# Patient Record
Sex: MALE | Race: WHITE | NOT HISPANIC OR LATINO | Employment: OTHER | URBAN - METROPOLITAN AREA
[De-identification: names, ages, dates, MRNs, and addresses within clinical notes are randomized per-mention and may not be internally consistent; named-entity substitution may affect disease eponyms.]

---

## 2022-04-19 ENCOUNTER — HOSPITAL ENCOUNTER (OUTPATIENT)
Dept: PREADMISSION TESTING | Facility: OTHER | Age: 37
Discharge: HOME | End: 2022-04-19
Attending: ORTHOPAEDIC SURGERY
Payer: OTHER GOVERNMENT

## 2022-04-19 ENCOUNTER — ANESTHESIA EVENT (OUTPATIENT)
Dept: SURGERY | Facility: OTHER | Age: 37
End: 2022-04-19
Payer: OTHER GOVERNMENT

## 2022-04-19 VITALS
SYSTOLIC BLOOD PRESSURE: 128 MMHG | OXYGEN SATURATION: 100 % | RESPIRATION RATE: 16 BRPM | TEMPERATURE: 98 F | DIASTOLIC BLOOD PRESSURE: 71 MMHG | BODY MASS INDEX: 28.23 KG/M2 | HEIGHT: 74 IN | WEIGHT: 220 LBS | HEART RATE: 68 BPM

## 2022-04-19 RX ORDER — LIDOCAINE HYDROCHLORIDE 10 MG/ML
0.5 INJECTION, SOLUTION EPIDURAL; INFILTRATION; INTRACAUDAL; PERINEURAL ONCE
Status: CANCELLED | OUTPATIENT
Start: 2022-04-19 | End: 2022-04-19

## 2022-04-19 RX ORDER — ACETAMINOPHEN 500 MG
1000 TABLET ORAL
Status: CANCELLED | OUTPATIENT
Start: 2022-04-19 | End: 2022-04-19

## 2022-04-19 RX ORDER — SODIUM CHLORIDE, SODIUM LACTATE, POTASSIUM CHLORIDE, CALCIUM CHLORIDE 600; 310; 30; 20 MG/100ML; MG/100ML; MG/100ML; MG/100ML
INJECTION, SOLUTION INTRAVENOUS CONTINUOUS
Status: CANCELLED | OUTPATIENT
Start: 2022-04-19

## 2022-04-19 NOTE — DISCHARGE INSTRUCTIONS
Information to Prepare you for your Surgery    PRE-ADMIT TESTING -  [phone]    [address] [facility]          Your surgery has been scheduled at [facility]. We are pleased to have the opportunity to serve you. For Further Information please call [phone].    On the day of surgery please report to the Information Desk on the 1st floor.    CONTACT YOUR PHYSICIAN'S OFFICE THE DAY PRIOR TO YOUR SURGERY TO OBTAIN YOUR ARRIVAL TIME.     The evening before surgery do not eat anything after 9 p.m. ( this includes hard candy, chewing gum and mints).  You may only have GATORADE, POWERADE AND WATER  from 9 p.m. until you leave your home.   DO NOT DRINK ANY LIQUIDS ON THE WAY TO THE HOSPITAL.      Why does your anesthesiologist allow you to drink Gatorade/Powerade before surgery?  Gatorade/Powerade helps to increase your comfort before surgery and to decrease your nausea after surgery. The carbohydrates in Gatorade/Powerade help reduce your body's stress response to surgery.  If you are a diabetic-drink only water prior to surgery.      Current Visitor policy(12/27/2021) - Patients may have 2 visitors pre and post procedure. Only 2 visitors will be allowed in the Surgical building with the patient.     SPECIAL MEDICATION INSTRUCTIONS: TAKE medications checked off by the Anesthesiologist on your Medication List.    Angiogram Patients: Take medications as instructed by your physician, including aspirin.     Surgery Patients:    If you take ASPIRIN - Your PHYSICIAN/SURGEON will need to inform you IF/OR when you need to stop taking aspirin prior to your surgery.     Do Not take any medications containing IBUPROFEN.    Do Not Wear any make-up (especially eye make-up) to surgery. Please remove any false eyelashes or eyelash extensions. If you arrive the day of surgery with makeup/eyelashes on you will be required to remove prior to surgery. (There is a risk of corneal  abrasions if eye makeup/eyelash extensions are not removed)      Leave all valuables at home.   Do Not wear any jewelry or watches, including any metal in body piercings. Jewelry must be removed prior to coming to the hospital.  There is a possibility that rings that are unable to be removed may be cut off if they are on the surgical extremity.    Please remove all hair extensions, wigs, clips and any other metal accessories/ ornaments from your hair.  These items may pose a flammable/fire risk in Surgery and must be removed.    Do not shave your surgical area at least 5 days prior to your surgery. The surgical prep will be performed at the hospital according to Infection Control regulations.    Contact Lens must be removed before surgery. Either do not wear the contact lens or bring a case and solution for storage.  Please bring a container for eyeglasses or dentures as required.  Bring any paperwork your physician has provided, such as consent forms,  history and physicals, doctor's orders, etc.   Bring comfortable clothes that are loose fitting to wear upon discharge. Take into consideration the type of surgery being performed.  Maintain your diet as advised per your physician the day prior to surgery.      Adequate rest the night before surgery is advised.   Park in the Parking lot behind the hospital or in the [employer] [facility] across the street from the parking lot. Parking is complimentary.  If you will be discharged the same day as your procedure, please arrange for a responsible adult to drive you home or to accompany you if traveling by taxi.   YOU WILL NOT BE PERMITTED TO DRIVE OR TO LEAVE THE HOSPITAL ALONE AFTER SURGERY.   If you are being discharged the same day, it is strongly recommended that you arrange for someone to remain with you for the first 24 hrs following your surgery.    The Surgeon will speak to your family/visitor after your surgery regarding the outcome of your surgery and post op  care.  The Surgeon may speak to you after your surgery, but there is a possibility you may not remember the details.  Please check with your family members regarding the conversation with the Surgeon.    We strongly recommend whoever is bringing you home be present for discharge instructions.  This will ensure a thorough understanding for your post op home care.    ALL CHILDREN MUST ALWAYS BE ACCOMPANIED BY AN ADULT.    Visitors-Refer to current Visitor policy handouts.    Thank you for your cooperation.  The Staff of [facility].            Bathing Instructions with Hibiclens    Shower the evening before and morning of your procedure with Hibiclens:  Wash your face with water and your regular face wash/soap  Apply Hibiclens directly on your skin or on a wet washcloth and wash gently. When showering: Move away from the shower stream when applying Hibiclens to avoid rinsing off too soon.  Rinse thoroughly with warm water  Do not dilute Hibiclens        Dry off as usual, do not use any deodorant, powder, body lotions, perfume, after shave or cologne.

## 2022-04-19 NOTE — ANESTHESIA PREPROCEDURE EVALUATION
04/19/2022  Omer Chow is a 37 y.o., male.      Pre-op Assessment    I have reviewed the Patient Summary Reports.     I have reviewed the Nursing Notes.    I have reviewed the Medications.     Review of Systems  Anesthesia Hx:  No previous Anesthesia  Denies Family Hx of Anesthesia complications.   Denies Personal Hx of Anesthesia complications.   Social:  Non-Smoker    Hematology/Oncology:  Hematology Normal   Oncology Normal     EENT/Dental:EENT/Dental Normal   Cardiovascular:  Cardiovascular Normal     Pulmonary:  Pulmonary Normal    Renal/:  Renal/ Normal     Hepatic/GI:  Hepatic/GI Normal    Musculoskeletal:  Musculoskeletal Normal    Neurological:  Neurology Normal    Endocrine:  Endocrine Normal    Dermatological:  Skin Normal    Psych:  Psychiatric Normal           Physical Exam  General: Well nourished, Cooperative, Alert and Oriented    Airway:  Mallampati: I   Mouth Opening: Normal  Neck ROM: Normal ROM    Dental:  Intact        Anesthesia Plan  Type of Anesthesia, risks & benefits discussed:    Anesthesia Type: Gen ETT  Intra-op Monitoring Plan: Standard ASA Monitors  Post Op Pain Control Plan: multimodal analgesia and peripheral nerve block  Induction:  IV  Airway Plan: Video, Post-Induction  ASA Score: 1  Anesthesia Plan Notes: No lab,block discussed    Ready For Surgery From Anesthesia Perspective.     .

## 2022-04-22 ENCOUNTER — HOSPITAL ENCOUNTER (OUTPATIENT)
Facility: OTHER | Age: 37
Discharge: HOME | End: 2022-04-22
Attending: ORTHOPAEDIC SURGERY | Admitting: ORTHOPAEDIC SURGERY
Payer: OTHER GOVERNMENT

## 2022-04-22 ENCOUNTER — ANESTHESIA (OUTPATIENT)
Dept: SURGERY | Facility: OTHER | Age: 37
End: 2022-04-22
Payer: OTHER GOVERNMENT

## 2022-04-22 VITALS
TEMPERATURE: 98 F | RESPIRATION RATE: 16 BRPM | DIASTOLIC BLOOD PRESSURE: 78 MMHG | OXYGEN SATURATION: 98 % | HEART RATE: 80 BPM | WEIGHT: 220 LBS | HEIGHT: 74 IN | BODY MASS INDEX: 28.23 KG/M2 | SYSTOLIC BLOOD PRESSURE: 125 MMHG

## 2022-04-22 DIAGNOSIS — S43.431A SLAP LESION OF RIGHT SHOULDER: ICD-10-CM

## 2022-04-22 PROCEDURE — C1713 ANCHOR/SCREW BN/BN,TIS/BN: HCPCS | Performed by: ORTHOPAEDIC SURGERY

## 2022-04-22 PROCEDURE — 25000003 PHARM REV CODE 250: Performed by: ORTHOPAEDIC SURGERY

## 2022-04-22 PROCEDURE — 01716 ANES NRV MSC UPR A&E TNDSIS: CPT | Performed by: ORTHOPAEDIC SURGERY

## 2022-04-22 PROCEDURE — 25000003 PHARM REV CODE 250: Performed by: ANESTHESIOLOGY

## 2022-04-22 PROCEDURE — 71000033 HC RECOVERY, INTIAL HOUR: Performed by: ORTHOPAEDIC SURGERY

## 2022-04-22 PROCEDURE — C1776 JOINT DEVICE (IMPLANTABLE): HCPCS | Performed by: ORTHOPAEDIC SURGERY

## 2022-04-22 PROCEDURE — 63600175 PHARM REV CODE 636 W HCPCS: Performed by: ANESTHESIOLOGY

## 2022-04-22 PROCEDURE — 36000710: Performed by: ORTHOPAEDIC SURGERY

## 2022-04-22 PROCEDURE — 27201423 OPTIME MED/SURG SUP & DEVICES STERILE SUPPLY: Performed by: ORTHOPAEDIC SURGERY

## 2022-04-22 PROCEDURE — 71000016 HC POSTOP RECOV ADDL HR: Performed by: ORTHOPAEDIC SURGERY

## 2022-04-22 PROCEDURE — 63600175 PHARM REV CODE 636 W HCPCS: Performed by: ORTHOPAEDIC SURGERY

## 2022-04-22 PROCEDURE — 37000008 HC ANESTHESIA 1ST 15 MINUTES: Performed by: ORTHOPAEDIC SURGERY

## 2022-04-22 PROCEDURE — 25000003 PHARM REV CODE 250: Performed by: NURSE ANESTHETIST, CERTIFIED REGISTERED

## 2022-04-22 PROCEDURE — 37000009 HC ANESTHESIA EA ADD 15 MINS: Performed by: ORTHOPAEDIC SURGERY

## 2022-04-22 PROCEDURE — 63600175 PHARM REV CODE 636 W HCPCS: Performed by: NURSE ANESTHETIST, CERTIFIED REGISTERED

## 2022-04-22 PROCEDURE — 71000015 HC POSTOP RECOV 1ST HR: Performed by: ORTHOPAEDIC SURGERY

## 2022-04-22 PROCEDURE — 63600175 PHARM REV CODE 636 W HCPCS

## 2022-04-22 PROCEDURE — 36000711: Performed by: ORTHOPAEDIC SURGERY

## 2022-04-22 PROCEDURE — 63600175 PHARM REV CODE 636 W HCPCS: Performed by: PHYSICIAN ASSISTANT

## 2022-04-22 PROCEDURE — 64415 NJX AA&/STRD BRCH PLXS IMG: CPT | Performed by: ANESTHESIOLOGY

## 2022-04-22 RX ORDER — MEPERIDINE HYDROCHLORIDE 25 MG/ML
12.5 INJECTION INTRAMUSCULAR; INTRAVENOUS; SUBCUTANEOUS ONCE AS NEEDED
Status: DISCONTINUED | OUTPATIENT
Start: 2022-04-22 | End: 2022-04-22 | Stop reason: HOSPADM

## 2022-04-22 RX ORDER — HYDROCODONE BITARTRATE AND ACETAMINOPHEN 7.5; 325 MG/1; MG/1
1 TABLET ORAL
Qty: 40 TABLET | Refills: 0 | Status: SHIPPED | OUTPATIENT
Start: 2022-04-22

## 2022-04-22 RX ORDER — METHYLPREDNISOLONE ACETATE 40 MG/ML
INJECTION, SUSPENSION INTRA-ARTICULAR; INTRALESIONAL; INTRAMUSCULAR; SOFT TISSUE
Status: DISCONTINUED | OUTPATIENT
Start: 2022-04-22 | End: 2022-04-22 | Stop reason: HOSPADM

## 2022-04-22 RX ORDER — CEFAZOLIN SODIUM 1 G/3ML
2 INJECTION, POWDER, FOR SOLUTION INTRAMUSCULAR; INTRAVENOUS
Status: COMPLETED | OUTPATIENT
Start: 2022-04-22 | End: 2022-04-22

## 2022-04-22 RX ORDER — SODIUM CHLORIDE 0.9 % (FLUSH) 0.9 %
3 SYRINGE (ML) INJECTION
Status: DISCONTINUED | OUTPATIENT
Start: 2022-04-22 | End: 2022-04-22 | Stop reason: HOSPADM

## 2022-04-22 RX ORDER — MIDAZOLAM HYDROCHLORIDE 1 MG/ML
INJECTION INTRAMUSCULAR; INTRAVENOUS
Status: DISCONTINUED | OUTPATIENT
Start: 2022-04-22 | End: 2022-04-22

## 2022-04-22 RX ORDER — DEXAMETHASONE SODIUM PHOSPHATE 4 MG/ML
INJECTION, SOLUTION INTRA-ARTICULAR; INTRALESIONAL; INTRAMUSCULAR; INTRAVENOUS; SOFT TISSUE
Status: DISCONTINUED | OUTPATIENT
Start: 2022-04-22 | End: 2022-04-22

## 2022-04-22 RX ORDER — PROPOFOL 10 MG/ML
VIAL (ML) INTRAVENOUS
Status: DISCONTINUED | OUTPATIENT
Start: 2022-04-22 | End: 2022-04-22

## 2022-04-22 RX ORDER — OXYCODONE HYDROCHLORIDE 5 MG/1
5 TABLET ORAL
Status: DISCONTINUED | OUTPATIENT
Start: 2022-04-22 | End: 2022-04-22 | Stop reason: HOSPADM

## 2022-04-22 RX ORDER — LIDOCAINE HYDROCHLORIDE 10 MG/ML
0.5 INJECTION, SOLUTION EPIDURAL; INFILTRATION; INTRACAUDAL; PERINEURAL ONCE
Status: DISCONTINUED | OUTPATIENT
Start: 2022-04-22 | End: 2022-04-22 | Stop reason: HOSPADM

## 2022-04-22 RX ORDER — ACETAMINOPHEN 500 MG
1000 TABLET ORAL
Status: COMPLETED | OUTPATIENT
Start: 2022-04-22 | End: 2022-04-22

## 2022-04-22 RX ORDER — SODIUM CHLORIDE, SODIUM LACTATE, POTASSIUM CHLORIDE, CALCIUM CHLORIDE 600; 310; 30; 20 MG/100ML; MG/100ML; MG/100ML; MG/100ML
INJECTION, SOLUTION INTRAVENOUS CONTINUOUS
Status: DISCONTINUED | OUTPATIENT
Start: 2022-04-22 | End: 2022-04-22 | Stop reason: HOSPADM

## 2022-04-22 RX ORDER — FENTANYL CITRATE 50 UG/ML
INJECTION, SOLUTION INTRAMUSCULAR; INTRAVENOUS
Status: DISCONTINUED | OUTPATIENT
Start: 2022-04-22 | End: 2022-04-22

## 2022-04-22 RX ORDER — PROCHLORPERAZINE EDISYLATE 5 MG/ML
5 INJECTION INTRAMUSCULAR; INTRAVENOUS EVERY 30 MIN PRN
Status: DISCONTINUED | OUTPATIENT
Start: 2022-04-22 | End: 2022-04-22 | Stop reason: HOSPADM

## 2022-04-22 RX ORDER — ONDANSETRON 2 MG/ML
INJECTION INTRAMUSCULAR; INTRAVENOUS
Status: DISCONTINUED | OUTPATIENT
Start: 2022-04-22 | End: 2022-04-22

## 2022-04-22 RX ORDER — BUPIVACAINE HYDROCHLORIDE 2.5 MG/ML
INJECTION, SOLUTION EPIDURAL; INFILTRATION; INTRACAUDAL
Status: DISCONTINUED | OUTPATIENT
Start: 2022-04-22 | End: 2022-04-22 | Stop reason: HOSPADM

## 2022-04-22 RX ORDER — HYDROMORPHONE HYDROCHLORIDE 2 MG/ML
0.4 INJECTION, SOLUTION INTRAMUSCULAR; INTRAVENOUS; SUBCUTANEOUS EVERY 5 MIN PRN
Status: DISCONTINUED | OUTPATIENT
Start: 2022-04-22 | End: 2022-04-22 | Stop reason: HOSPADM

## 2022-04-22 RX ORDER — ROPIVACAINE HYDROCHLORIDE 5 MG/ML
INJECTION, SOLUTION EPIDURAL; INFILTRATION; PERINEURAL
Status: COMPLETED | OUTPATIENT
Start: 2022-04-22 | End: 2022-04-22

## 2022-04-22 RX ORDER — ROCURONIUM BROMIDE 10 MG/ML
INJECTION, SOLUTION INTRAVENOUS
Status: DISCONTINUED | OUTPATIENT
Start: 2022-04-22 | End: 2022-04-22

## 2022-04-22 RX ORDER — LIDOCAINE HYDROCHLORIDE 20 MG/ML
INJECTION INTRAVENOUS
Status: DISCONTINUED | OUTPATIENT
Start: 2022-04-22 | End: 2022-04-22

## 2022-04-22 RX ORDER — SODIUM CHLORIDE 9 MG/ML
INJECTION, SOLUTION INTRAVENOUS CONTINUOUS
Status: DISCONTINUED | OUTPATIENT
Start: 2022-04-22 | End: 2022-04-22 | Stop reason: HOSPADM

## 2022-04-22 RX ADMIN — ONDANSETRON HYDROCHLORIDE 4 MG: 2 INJECTION INTRAMUSCULAR; INTRAVENOUS at 07:04

## 2022-04-22 RX ADMIN — PROPOFOL 250 MG: 10 INJECTION, EMULSION INTRAVENOUS at 06:04

## 2022-04-22 RX ADMIN — ROCURONIUM BROMIDE 50 MG: 10 INJECTION, SOLUTION INTRAVENOUS at 06:04

## 2022-04-22 RX ADMIN — ACETAMINOPHEN 1000 MG: 500 TABLET, FILM COATED ORAL at 05:04

## 2022-04-22 RX ADMIN — SUGAMMADEX 200 MG: 100 INJECTION, SOLUTION INTRAVENOUS at 07:04

## 2022-04-22 RX ADMIN — MIDAZOLAM HYDROCHLORIDE 2 MG: 1 INJECTION, SOLUTION INTRAMUSCULAR; INTRAVENOUS at 06:04

## 2022-04-22 RX ADMIN — LIDOCAINE HYDROCHLORIDE 50 MG: 20 INJECTION, SOLUTION INTRAVENOUS at 06:04

## 2022-04-22 RX ADMIN — DEXAMETHASONE SODIUM PHOSPHATE 8 MG: 4 INJECTION, SOLUTION INTRAMUSCULAR; INTRAVENOUS at 07:04

## 2022-04-22 RX ADMIN — ROPIVACAINE HYDROCHLORIDE 20 ML: 5 INJECTION, SOLUTION EPIDURAL; INFILTRATION; PERINEURAL at 06:04

## 2022-04-22 RX ADMIN — GLYCOPYRROLATE 0.2 MG: 0.2 INJECTION, SOLUTION INTRAMUSCULAR; INTRAVITREAL at 07:04

## 2022-04-22 RX ADMIN — SODIUM CHLORIDE, SODIUM LACTATE, POTASSIUM CHLORIDE, AND CALCIUM CHLORIDE: 600; 310; 30; 20 INJECTION, SOLUTION INTRAVENOUS at 06:04

## 2022-04-22 RX ADMIN — FENTANYL CITRATE 100 MCG: 50 INJECTION, SOLUTION INTRAMUSCULAR; INTRAVENOUS at 06:04

## 2022-04-22 RX ADMIN — CEFAZOLIN 2 G: 330 INJECTION, POWDER, FOR SOLUTION INTRAMUSCULAR; INTRAVENOUS at 07:04

## 2022-04-22 NOTE — ANESTHESIA POSTPROCEDURE EVALUATION
Anesthesia Post Evaluation    Patient: Omer Chow    Procedure(s) Performed: Procedure(s) (LRB):  ARTHROSCOPY, SHOULDER (Right)  REPAIR,TENDON,DISTAL PROXIMAL (Right)    Final Anesthesia Type: general      Patient location during evaluation: PACU  Patient participation: Yes- Able to Participate  Level of consciousness: awake and alert  Post-procedure vital signs: reviewed and stable  Pain management: adequate  Airway patency: patent    PONV status at discharge: No PONV  Anesthetic complications: no      Cardiovascular status: blood pressure returned to baseline  Respiratory status: unassisted  Hydration status: euvolemic  Follow-up not needed.          Vitals Value Taken Time   /78 04/22/22 0950   Temp 36.8 °C (98.2 °F) 04/22/22 0850   Pulse 80 04/22/22 0950   Resp 16 04/22/22 0950   SpO2 98 % 04/22/22 0950         Event Time   Out of Recovery 08:47:04         Pain/Mo Score: Pain Rating Prior to Med Admin: 0 (4/22/2022  5:35 AM)  Mo Score: 10 (4/22/2022  9:50 AM)

## 2022-04-22 NOTE — H&P
"[facility]  History & Physical  Orthopedic Surgery    Subjective:     Chief Complaint/Reason for Admission: Right shoulder pain.    History of Present Illness:  Omer Chow is a 37 y.o. y/o male presenting with a history of pain in the right shoulder. Risks and benefits of surgery were discussed and the patient wishes to proceed with surgery at this time.      There are no problems to display for this patient.      No current facility-administered medications for this encounter.  No current outpatient medications on file.    Review of patient's allergies indicates:  No Known Allergies    Past Medical History:   Diagnosis Date    Sleep apnea     does not wear cpaq        Past Surgical History:   Procedure Laterality Date    KNEE ARTHROSCOPY          No family history on file.     Social History     Tobacco Use    Smoking status: Never Smoker    Smokeless tobacco: Never Used   Substance Use Topics    Alcohol use: Yes     Comment: rare        Review of Systems:  Constitutional: negative for fever, weight loss  Cardiovascular: negative for chest pain, edema  Respiratory: negative for shortness of breath, cough  HEENT: negative for headaches, vision problems  Skin: negative for bruising, skin infections, rashes  GI: negative for nausea, vomiting  : negative for dysuria, hematuria  Psych: negative for anxiety, depression  Musculoskeletal: positive for joint pain  Endocrine: negative for cold intolerance, excessive thirst  Hematologic: negative for prolonged bleeding, use of blood thinners    OBJECTIVE:     General Appearance:    Alert, cooperative, no distress, appears stated age   Vital Signs:            Head:      Vitals:    04/21/22 0900   Weight: 99.8 kg (220 lb)   Height: 6' 2" (1.88 m)        Normocephalic, without obvious abnormality, atraumatic   Eyes:    PERRL, conjunctiva/corneas clear, both eyes        Nose:   Nares normal, no drainage or sinus tenderness   Throat:   " Lips, mucosa, and tongue normal; teeth and gums normal   Neck:   Supple, normal ROM   Back:     Symmetric, no curvature, ROM normal   Lungs:     CTA bilaterally, respirations unlabored   Chest wall:    No tenderness or deformity   Heart:    Regular rate and rhythm, S1 and S2 normal, no murmur, rub   or gallop   Abdomen:     Soft, non-tender   Extremities:   See clinic note   Pulses:   2+ and symmetric all extremities   Skin:   Skin color, texture, turgor normal, no rashes or lesions           Imaging Review:  Imaging reviewed    ASSESSMENT/PLAN:     Plan/Surgical Procedure: Right shoulder arthroscopy    Surgery Date / Location: 4/22/22 by Dr. Mcdonald at [facility]    Pre-op clearance per Anesthesia    DVT Prophylaxis: No blood thinners will be required post-operatively    Pt will d/c NSAIDs, Aspirin-containing products 7 days prior to procedure.    Informed written consent has been signed, patient wishes to proceed at this time.      Fred Torres PA-C  Orthopedics

## 2022-04-22 NOTE — PLAN OF CARE
Omer Niurkajustine Geraldo has met all discharge criteria from Phase II. Vital Signs are stable, ambulating  without difficulty. Discharge instructions given, patient verbalized understanding. Discharged from facility via wheelchair in stable condition.

## 2022-04-22 NOTE — PATIENT INSTRUCTIONS
Daniel Mcdonald M.D.  Sarabjit Bingham M.D.  Monster Hilton M.D.          Critical access hospital[facility] [address]  [place] [address]  Phone: [phone]  Fax: [phone]         DISCHARGE INSTRUCTIONS for Shoulder Surgery             Call our office ([phone]) immediately if you experience any of the following:      Excessive bleeding or pus like drainage at the incision site       Uncontrollable pain not relieved by pain medication       Excessive swelling or redness at the incision site       Fever above 101.5 degrees not controlled with Tylenol or Motrin       Shortness of Breath       Any foul odor or blistering from the surgery site     FOR EMERGENCIES: If any unusual problems or difficulties occur, call our office at [phone], or [phone] (After hours) or contact 911 at any time for emergencies    1.   Diet: Eat a liquid / bland diet for the first day after surgery. Progress your to your regular diet as tolerated. Constipation may occur with Narcotic usage, contact our office if you are experiencing constipation.    2.   Pain Management: Ice, pain medications and anesthesia injections are used to manage your post-operative pain.    *Medications: You were given one or more narcotic pain medications before leaving the hospital. Have the prescriptions filled at a pharmacy on your way home and follow the instructions on the bottles.    *Narcotic Medication (usually Norco - hydrocodone or Percocet - oxycodone): Take this medication if needed to relieve severe pain. Take 1 pill every 4 hours. If your pain is not relieved you make take a second pill at your next dose. Always take with food.     *Take note: if you find you are taking more than 1 pill at EACH dose, contact the office as        the amount of acetaminophen may exceed appropriate levels.    *Nausea / Vomiting: For this issue, contact our office for a separate prescription that may be called in to your pharmacy.    *Cold Therapy: You may have been  sent home with a cold therapy unit and wrap for your shoulder. Fill with ice and water to the indicated fill line and use throughout the day for the first 3-5 days and then as needed to help relieve pain and control swelling. If you have not received one of these units, a bag of Ice will work as well. Use it as often as 20 minutes ONCE every hour. You can continue this for several weeks following surgery if needed.    *Regional Anesthesia Injections (Blocks): You may have been given a regional nerve block either before or after surgery. This may make your entire arm numb for 24-36 hours.          * Proceed with caution when trying to use your arm     3.  Constipation: During your hospital stay, you will be given a bowel regulating medication known as Miralax (Polyethylene Glycol 3350 or PEG 3350), this is given once daily and should be taken daily as long as you are taking pain medicine. It is an odorless, colorless powder and dissolved without any aftertaste. This helps regulate bowel function and is important to counteract the constipation effect of the pain medicine you will be given after surgery. If you continue to experience constipation after you are discharged, follow this recommendation:  Miralax - 1 cap full mixed with any liquid once daily  If no bowel movement OR very painful/difficult bowel movement within 2-3 days, begin Miralax - 1 cap full mixed with any liquid twice daily, then once a normal bowel movement occurs, decrease back to once daily  If no bowel movement with the twice daily regimen, take Miralax every 8 hours until a bowel movement occurs, then decrease back to once daily    4. Blood Clot Prevention: Blood clots are potential complications following any surgery. A blood clot from your leg can travel to your lungs and cause serious health complications. Preventing a blood clot from forming is critical and we do this by doing taking the following actions:  Exercising and staying active  (moving about)  Wearing support stockings  The symptoms of a blood clot include:   Pain and / or redness in your calf and leg unrelated to your incision.   Increased swelling of your thigh, calf, ankle, or foot.   Increased skin temperature at the site of the incision.   Shortness of breath and chest pain or pain when breathing.    5.   Return visit: Please schedule your return visit to Dr. Mcdonald's office approximately 3 days after your procedure.    6.   Activity: Limit your activity during the first 48 hours, keep your arm elevated with pillows. After the first 48 hours at home, increase your activity level based on your symptoms.    7.   Dressing Change: Arthroscopy portals (wounds) are small and are usually closed with either steri-strips or sutures. It is normal for some blood / drainage to be seen on the dressings. It is also normal for you to see apparent bruising on the skin around your shoulder when you remove the dressing. If present, leave the steri-strip tape across the incisions. If you are concerned by the drainage or the appearance of your shoulder, please call one of the numbers listed above. You can remove the dressing (not the steri-strips) on the 2nd day after surgery. For larger incisions, you will need to keep it away from water until the stitches or staples are removed. You can use an ace bandage for support and compression if desired.     8.   Showering: You may shower on the 3rd day after surgery if the wound is dry and clean, but do not let the wound soak in water until sutures are removed. Do not submerge in any water until after your 2 week postoperative appointment in clinic.    9.   Shoulder Sling (with/without Pillow attachment): You may have been sent home with a sling / pillow attachment holding your arm away from your body. You may remove the sling when changing clothes or bathing. Make sure to wear the sling while sleeping unless instructed otherwise. You may remove at rest or for  exercises.       [x] You need to wear the sling for 24 hours a day for  4 weeks.    10.  Shoulder Exercises: Begin these exercises the first day after surgery in order to help you regain your motion and strength. You may do ONLY THE FOLLOWING MARKED exercises 3 times daily:       [x] Shoulder shrugs - Shrug your shoulders up and down.       [x] Pendulums - Bend forward allowing your arm to hang down in front of you. Gently swing your arm side-to-side and front to back.                                                                                                                                   [x] Elbow motion - Straighten and bend your elbow.                                                                                                                   [x] Ball squeezes - use ball attached to sling/pillow or soft (nerf) ball for  strengthening                                                                                                                     [x] Scapular retractions - (Squeeze shoulder blades together): Squeeze shoulder blades together while slightly pulling them down (do not shrug your shoulders upward); You can perform 10-15 reps, several times throughout the day, when seated at your desk, driving in the car, etc.                                                                                                                      11.  Physical Therapy: Rehabilitation is an essential component to your recovery from surgery. You will need formal physical therapy. If you require formal physical therapy, you are encouraged to find a Physical Therapy center that is both near your residence and comfortable to you. Please notify us if you have a preference of a Physical Therapy clinic. Please contact the office at the numbers above if you have any questions or if there is any delay in the start of Physical Therapy.

## 2022-04-22 NOTE — ANESTHESIA PROCEDURE NOTES
Peripheral Block    Patient location during procedure: holding area   Block not for primary anesthetic.  Reason for block: at surgeon's request and post-op pain management   Post-op Pain Location: shoulder pain   Timeout: 4/22/2022 6:18 AM   End time: 4/22/2022 6:25 AM    Staffing  Authorizing Provider: Ari Gates MD  Performing Provider: Ari Gates MD    Preanesthetic Checklist  Completed: patient identified, IV checked, site marked, risks and benefits discussed, surgical consent, monitors and equipment checked, pre-op evaluation and timeout performed  Peripheral Block  Patient position: supine  Prep: ChloraPrep  Patient monitoring: heart rate, cardiac monitor, continuous pulse ox and frequent blood pressure checks  Block type: interscalene  Laterality: right  Injection technique: single shot  Needle  Needle type: Stimuplex   Needle gauge: 22 G  Needle length: 3.5 in  Needle localization: ultrasound guidance and nerve stimulator   -ultrasound image captured on disc.  Assessment  Injection assessment: negative aspiration, negative parasthesia and local visualized surrounding nerve  Paresthesia pain: none  Heart rate change: no  Slow fractionated injection: yes  Pain Tolerance: comfortable throughout block and no complaints  Medications:    Medications: ropivacaine (NAROPIN) injection 0.5% - Perineural   20 mL - 4/22/2022 6:25:00 AM

## 2022-04-22 NOTE — TRANSFER OF CARE
"Anesthesia Transfer of Care Note    Patient: Omer Chow    Procedure(s) Performed: Procedure(s) (LRB):  ARTHROSCOPY, SHOULDER (Right)  REPAIR,TENDON,DISTAL PROXIMAL (Right)    Patient location: PACU    Anesthesia Type: general    Transport from OR: Transported from OR on 2-3 L/min O2 by NC with adequate spontaneous ventilation    Post pain: adequate analgesia    Post assessment: no apparent anesthetic complications    Post vital signs: stable    Level of consciousness: awake    Nausea/Vomiting: no nausea/vomiting    Complications: none    Transfer of care protocol was followed      Last vitals:   Visit Vitals  /84 (BP Location: Left arm, Patient Position: Lying)   Pulse 68   Temp 36.7 °C (98.1 °F) (Oral)   Resp 18   Ht 6' 2" (1.88 m)   Wt 99.8 kg (220 lb)   SpO2 99%   BMI 28.25 kg/m²     "

## 2022-04-22 NOTE — BRIEF OP NOTE
[facility]  Brief Operative Note  Orthopaedic Surgery     SUMMARY     Surgery Date: 4/22/2022     Surgeon(s) and Role:     * Daniel Mcdonald MD - Primary    Assisting Surgeon: None    Assistants: Fred Torres PA-C    Pre-op Diagnosis:  Superior glenoid labrum lesion of right shoulder, initial encounter [S43.431A]  Bicipital tendinitis of right shoulder [M75.21]    Post-op Diagnosis:  Post-Op Diagnosis Codes:     * Superior glenoid labrum lesion of right shoulder, initial encounter [S43.431A]     * Bicipital tendinitis of right shoulder [M75.21]    Procedure(s) (LRB):  ARTHROSCOPY, SHOULDER (Right)  REPAIR,TENDON,DISTAL PROXIMAL (Right)    Anesthesia: General    Description of the findings of the procedure: See dictated operative report for details    Estimated Blood Loss: less than 10         Specimens:   Specimen (24h ago, onward)            None          Discharge Note    SUMMARY     Admit Date: 4/22/2022    Discharge Date and Time:  04/22/2022 9:16 AM    Hospital Course (synopsis of major diagnoses, care, treatment, and services provided during the course of the hospital stay): Patient underwent outpatient right Shoulder surgery and was transferred to PACU in stable condition. In PACU, patient received appropriate post-operative care and discharged home with plans for physical therapy and follow-up with the operative surgeon.    Pre-op Diagnosis:  Superior glenoid labrum lesion of right shoulder, initial encounter [S43.431A]  Bicipital tendinitis of right shoulder [M75.21]    Final Diagnosis:  Post-Op Diagnosis Codes:     * Superior glenoid labrum lesion of right shoulder, initial encounter [S43.431A]     * Bicipital tendinitis of right shoulder [M75.21]    Procedure(s) (LRB):  ARTHROSCOPY, SHOULDER (Right)  REPAIR,TENDON,DISTAL PROXIMAL (Right)     Diet: Regular     Disposition: Home or Self Care    Follow Up/Patient Instructions:     Medications:  Reconciled Home Medications:       Medication List      START taking these medications    HYDROcodone-acetaminophen 7.5-325 mg per tablet  Commonly known as: NORCO  Take 1 tablet by mouth every 4 to 6 hours as needed for Pain.          Discharge Procedure Orders   Diet general     Lifting restrictions     No driving, operating heavy equipment or signing legal documents while taking pain medication     Remove dressing in 72 hours   Order Comments: Remove padded dressing and apply large bandaids/bandages. Apply waterproof bandages for showering to arthroscopy portal sites. Do not remove steristrips.     Change dressing (specify)   Order Comments: Dressing change: 1 times per day using large bandaids/bandages. Apply waterproof bandages for showering to arthroscopy portal sites. Do not remove steristrips..     Call MD for:  temperature >100.4     Call MD for:  persistent nausea and vomiting     Call MD for:  severe uncontrolled pain     Call MD for:  difficulty breathing, headache or visual disturbances     Call MD for:  redness, tenderness, or signs of infection (pain, swelling, redness, odor or green/yellow discharge around incision site)     Call MD for:  hives     Call MD for:  persistent dizziness or light-headedness     Call MD for:  extreme fatigue     Activity as tolerated     Shower on day dressing removed (No bath)   Order Comments: POD #3      Follow-up Information     Daniel Mcdonald MD Follow up on 4/25/2022.    Specialty: Orthopedic Surgery  Why: For suture removal, For wound re-check  Contact information:  [facility] [address] [phone]

## 2022-04-22 NOTE — OP NOTE
Preop diagnosis:  Right shoulder impingement, SLAP tear  Postop diagnosis:  Same  Procedure:  Right shoulder arthroscopy, subacromial decompression, arthroscopic biceps tenodesis    Surgeon: Daniel Mcdonald     Assistants: Fred Torers    Anesthesia: General endotracheal anesthesia    History:  Mr. Chow is a 37-year-old female with longstanding history of left shoulder pain. He has failed conservative treatment and desires surgical intervention.  The risks, options, benefits of surgery were explained to the patient. He stated understanding and wished to proceed.  The risks include:  Bleeding, infection, blood clot, injury to nerve or blood vessel, continued pain, stiffness, re-rupture.    Operative Course:  The patient was identified in preop holding area. The right shoulder was marked as correct.  The patient was taken to the operating room, after adequate anesthesia, the patient was placed in lateral decubitus position.  The right arm suspended with 10 lb of traction.  The right shoulder was then sterilely prepped and draped in usual sterile fashion. We made standard posterior and anterior arthroscopy portals.    The cartilage surface of the humeral head and glenoid were pristine.  The biceps tendon did show some fraying near the biceps sling.  There was SLAP fraying but no true displaced labral tearing.  This was debrided.  We tagged the biceps tendon with fiberloop suture.  At this point we performed an arthroscopic biceps tenodesis using the loop and tack technique and a swivelock suture.   This gave us excellent biceps tenodesis.    We then turned our attention to the subacromial space.  We performed a subacromial decompression using electrocautery and a jean.    There was no evidence of any rotator cuff tearing.  There was subacromial bursitis which was debrided as well.     We then irrigated the wound. We closed the wound with nylon suture.  We placed a sterile bandage and UltraSling.  The patient was  awakened and taken to the recovery room in stable condition.  Instrument, needle, sponge counts were correct.  There were no complications.    REEMA Mcnamara participated in the important parts of this procedure including:  Positioning the patient, prepping the patient, assisting with debridement, biceps tenodesis, assistant with decompression,  assistant with closure, and bandage placement.

## 2022-04-22 NOTE — DISCHARGE INSTRUCTIONS
Anesthesia: After Your Surgery  Youve just had surgery. During surgery, you received medication called anesthesia to keep you comfortable and pain-free. After surgery, you may experience some pain or nausea. This is common. Here are some tips for feeling better and recovering after surgery.    Going home  Your doctor or nurse will show you how to take care of yourself when you go home. He or she will also answer your questions. Have an adult family member or friend drive you home. For the first 24 hours after your surgery:  Do not drive or use heavy equipment.  Do not make important decisions or sign legal documents.  Avoid alcohol.  Have someone stay with you, if needed. He or she can watch for problems and help keep you safe.  Take your time getting up from a seated or lying position. You may experience dizziness for 24 hours  Be sure to keep all follow-up appointments with your doctor. And rest after your procedure for as long as your doctor tells you to.    Coping with pain  If you have pain after surgery, pain medication will help you feel better. Take it as directed, before pain becomes severe. Also, ask your doctor or pharmacist about other ways to control pain, such as with heat, ice, and relaxation. And follow any other instructions your surgeon or nurse gives you.    URINARY RETENTION  Should you experience a decrease in your urine output or are unable to urinate following surgery, this can be due to the medications given during surgery.  We recommend you going to the nearest Emergency Department.    Tips for taking pain medication  To get the best relief possible, remember these points:  Pain medications can upset your stomach. Taking them with a little food may help.  Most pain relievers taken by mouth need at least 20 to 30 minutes to take effect.  Taking medication on a schedule can help you remember to take it. Try to time your medication so that you can take it before beginning an activity, such  as dressing, walking, or sitting down for dinner.  Constipation is a common side effect of pain medications. Contact your doctor before taking any medications like laxatives or stool softeners to help relieve constipation. Also ask about any dietary restrictions, because drinking lots of fluids and eating foods like fruits and vegetables that are high in fiber can also help. Remember, dont take laxatives unless your surgeon has prescribed them.  Mixing alcohol and pain medication can cause dizziness and slow your breathing. It can even be fatal. Dont drink alcohol while taking pain medication.  Pain medication can slow your reflexes. Dont drive or operate machinery while taking pain medication.  If your health care provider tells you to take acetaminophen to help relieve your pain, ask him or her how much you are supposed to take each day. (Acetaminophen is the generic name for Tylenol and other brand-name pain relievers.) Acetaminophen or other pain relievers may interact with your prescription medicines or other over-the-counter (OTC) drugs. Some prescription medications contain acetaminophen along with other active ingredients. Using both prescription and OTC acetaminophen for pain can cause you to overdose. The FDA recommends that you read the labels on your OTC medications carefully. This will help you to clearly understand the list of active ingredients, dosing instructions, and any warnings. It may also help you avoid taking too much acetaminophen. If you have questions or don't understand the information, ask your pharmacist or health care provider to explain it to you before you take the OTC medication.    Managing nausea  Some people have an upset stomach after surgery. This is often due to anesthesia, pain, pain medications, or the stress of surgery. The following tips will help you manage nausea and get good nutrition as you recover. If you were on a special diet before surgery, ask your doctor if you  should follow it during recovery. These tips may help:  Dont push yourself to eat. Your body will tell you when to eat and how much.  Start off with clear liquids and soup. They are easier to digest.  Progress to semi-solid foods (mashed potatoes, applesauce, and gelatin) as you feel ready.  Slowly move to solid foods. Dont eat fatty, rich, or spicy foods at first.  Dont force yourself to have three large meals a day. Instead, eat smaller amounts more often.  Take pain medications with a small amount of solid food, such as crackers or toast to avoid nausea.      Call your surgeon if    You feel too sleepy, dizzy, or groggy (medication may be too strong).  You have side effects like nausea, vomiting, or skin changes (rash, itching, or hives).   © 4188-8395 [employer]. [address] [place] [address]. All rights reserved. This information is not intended as a substitute for professional medical care. Always follow your healthcare professional's instructions.      Polar Care Cube Instructions             TO REMOVE THE ARC 2.0   1. Release the buckle of the shoulder strap, and let it slide off the back shoulder. If the underarm strap is being used, release the buckle on the underarm strap, leaving it clipped to the ring inside the neck pad.   2. Release the buckle of the ARC torso strap, and let it fall behind the back. Leave the ARC attached to the arm sling.   3. Remove the sling and ARC as one unit from the affected arm. Leave the sling and ARC attached to each other for re-application later.   TO RE-APPLY THE ARC 2.0   4. Slip affected arm into the arm sling. The arm sling and ARC should still be attached to one another.   5. With unaffected arm, reach behind your back and pull the ARC torso strap around to the front and attach the buckle at the front.   6. With the unaffected arm, reach behind your back and pull the shoulder strap around to the front and insert the buckle into the wrist  support strap of the arm sling.   TO RE-APPLY THE ARC 2.0 WITH UNDERARM STRAP ATTACHED   7. Slip the unaffected arm between the shoulder strap and remaining    FOLLOW ANY INSTRUCTIONS GIVEN BY DR GAUTAM

## (undated) DEVICE — SEE L#120831

## (undated) DEVICE — PAD SHOULDER CARE POLAR

## (undated) DEVICE — UNDERGLOVES BIOGEL PI SIZE 8.5

## (undated) DEVICE — GEMINI ARTHREX SR8

## (undated) DEVICE — KIT TRACTION SHLDR ST DISP LF

## (undated) DEVICE — BLADE GREAT WHITE STEALTH 5.5

## (undated) DEVICE — SUT VICRYL 3-0 27 SH

## (undated) DEVICE — DRESSING GAUZE 6PLY 4X4

## (undated) DEVICE — TIP YANKAUERS BULB NO VENT

## (undated) DEVICE — UNDERGLOVES BIOGEL PI SIZE 8

## (undated) DEVICE — PENCIL ELECTROSURG HOLST W/BLD

## (undated) DEVICE — GLOVE BIOGEL SKINSENSE PI 7.5

## (undated) DEVICE — PROBE ARTHO ENERGY 90 DEG

## (undated) DEVICE — BURR OVAL CUTTING 6 MM

## (undated) DEVICE — PAD ABD 8X10 STERILE

## (undated) DEVICE — DRAPE STERI INSTRUMENT 1018

## (undated) DEVICE — TAPE SURG MEDIPORE 6X72IN

## (undated) DEVICE — POSITIONER IV ARMBOARD FOAM

## (undated) DEVICE — GLOVE BIOGEL SKINSENSE PI 8.0

## (undated) DEVICE — TUBE SET INFLOW/OUTFLOW

## (undated) DEVICE — APPLICATOR CHLORAPREP ORN 26ML

## (undated) DEVICE — SUT ETHILON 3-0 FS-1 30

## (undated) DEVICE — GOWN B1 X-LG X-LONG

## (undated) DEVICE — Device

## (undated) DEVICE — DRESSING XEROFORM 1X8IN

## (undated) DEVICE — TUBING SUC UNIV W/CONN 12FT

## (undated) DEVICE — SOL IRR NACL .9% 3000ML

## (undated) DEVICE — ALCOHOL ISOPROPYL BLU 70% 16OZ

## (undated) DEVICE — CONNECTOR TUBING STR 5 IN 1